# Patient Record
Sex: FEMALE | Race: BLACK OR AFRICAN AMERICAN | Employment: FULL TIME | URBAN - METROPOLITAN AREA
[De-identification: names, ages, dates, MRNs, and addresses within clinical notes are randomized per-mention and may not be internally consistent; named-entity substitution may affect disease eponyms.]

---

## 2018-05-18 ENCOUNTER — OFFICE VISIT (OUTPATIENT)
Dept: OBGYN CLINIC | Age: 28
End: 2018-05-18

## 2018-05-18 VITALS — HEART RATE: 94 BPM | WEIGHT: 117 LBS | DIASTOLIC BLOOD PRESSURE: 76 MMHG | SYSTOLIC BLOOD PRESSURE: 128 MMHG

## 2018-05-18 DIAGNOSIS — N91.2 AMENORRHEA: Primary | ICD-10-CM

## 2018-05-19 NOTE — PROGRESS NOTES
Subjective:      Patient presents for missed menses, pregnancy test positive. This is her first pregnancy and she has no complaints. Current Outpatient Prescriptions   Medication Sig Dispense Refill    prenatal vit,calc76/iron/folic (PNV 45-9 PO) Take  by mouth. No Known Allergies  History reviewed. No pertinent past medical history. History reviewed. No pertinent surgical history. History reviewed. No pertinent family history. Social History   Substance Use Topics    Smoking status: Not on file    Smokeless tobacco: Not on file    Alcohol use Not on file      Review of Systems    A comprehensive review of systems was negative except for that written in the HPI. Objective:     Visit Vitals    /76    Pulse 94    Wt 117 lb (53.1 kg)    LMP 02/14/2018 (Exact Date)     General appearance: alert, cooperative, no distress, appears stated age  Head: Normocephalic, without obvious abnormality, atraumatic  Lungs: normal respiratory effort  Abdomen: soft, non-tender. Bowel sounds normal. No masses,  no organomegaly  Pelvic: cervix normal in appearance, no CMT, no adnexal masses or tenderness  Neurologic: Grossly normal    Dating ultrasound done and confirms LMP dating. See report for details. Assessment/Plan:     Pregnancy confirmed. Follow up for prenatal intake visit. Patient is interested in [facility], scheduled given. Follow up 1-2 weeks. Plan of care discussed. Patient expressed understanding.

## 2018-05-29 ENCOUNTER — HOSPITAL ENCOUNTER (OUTPATIENT)
Dept: LAB | Age: 28
Discharge: HOME | End: 2018-05-29

## 2018-05-29 ENCOUNTER — ROUTINE PRENATAL (OUTPATIENT)
Dept: OBGYN CLINIC | Age: 28
End: 2018-05-29

## 2018-05-29 VITALS
DIASTOLIC BLOOD PRESSURE: 66 MMHG | BODY MASS INDEX: 23.16 KG/M2 | SYSTOLIC BLOOD PRESSURE: 115 MMHG | RESPIRATION RATE: 18 BRPM | OXYGEN SATURATION: 100 % | HEIGHT: 60 IN | WEIGHT: 118 LBS | HEART RATE: 88 BPM

## 2018-05-29 DIAGNOSIS — Z34.82 PRENATAL CARE, SUBSEQUENT PREGNANCY, SECOND TRIMESTER: Primary | ICD-10-CM

## 2018-05-29 DIAGNOSIS — Z3A.14 PREGNANCY WITH 14 COMPLETED WEEKS GESTATION: ICD-10-CM

## 2018-05-29 LAB
CHLAMYDIA, EXTERNAL: NORMAL
HBSAG, EXTERNAL: NORMAL
HIV, EXTERNAL: NORMAL
N. GONORRHEA, EXTERNAL: NORMAL
RPR, EXTERNAL: NORMAL
RUBELLA, EXTERNAL: NORMAL
TYPE, ABO & RH, EXTERNAL: NORMAL

## 2018-05-29 PROCEDURE — 99001 SPECIMEN HANDLING PT-LAB: CPT | Performed by: OBSTETRICS & GYNECOLOGY

## 2018-05-29 NOTE — PROGRESS NOTES
PRENATAL INTAKE SUMMARY    Ms. Maykel Paul presents today for her first prenatal visit. She is  at 14w6d. Working Estimated Date of Delivery:  18 by Patient's last menstrual period was 2018 (exact date). and confirmed with ultrasound. I have reviewed the patient's medical, obstetrical, social, and family histories, medications, and available lab results. SUBJECTIVE  She complains of frequent headaches, usually relieved by eating. OBJECTIVE  Initial Physical Exam (New OB)    GENERAL APPEARANCE: {appearance:155125::\"alert, well appearing\",\"in no apparent distress.    HEAD: normocephalic, atraumatic  MOUTH: mucous membranes moist, pharynx normal without lesions  THYROID: no thyromegaly or masses present  BREASTS: no masses noted, no significant tenderness, no palpable axillary nodes, no skin changes  LUNGS: clear to auscultation, no wheezes, rales or rhonchi, symmetric air entry  HEART: regular rate and rhythm, no murmurs  ABDOMEN: soft, nontender, nondistended, no abnormal masses, no epigastric pain, fundus soft, nontender 14 weeks size and FHT present  EXTREMITIES: no redness or tenderness in the calves or thighs, no edema  SKIN: normal coloration and turgor, no rashes  LYMPH NODES: no adenopathy palpable  NEUROLOGIC: alert, oriented, normal speech, no focal findings or movement disorder noted    PELVIC EXAM EXTERNAL GENITALIA: normal appearing vulva with no masses, tenderness or lesions  VAGINA: no abnormal discharge or lesions  CERVIX: no lesions or cervical motion tenderness  UTERUS: gravid  ADNEXA: no masses palpable and nontender  OB EXAM PELVIMETRY: appears adequate    ASSESSMENT  Normal pregnancy    PLAN  Prenatal care  Follow up for Centering group XI  See orders
None

## 2018-05-30 LAB
ABO GROUP BLD: NORMAL
BACTERIA UR CULT: NORMAL
BASOPHILS # BLD AUTO: 0 X10E3/UL (ref 0–0.2)
BASOPHILS NFR BLD AUTO: 0 %
BLD GP AB SCN SERPL QL: NEGATIVE
EOSINOPHIL # BLD AUTO: 0.1 X10E3/UL (ref 0–0.4)
EOSINOPHIL NFR BLD AUTO: 2 %
ERYTHROCYTE [DISTWIDTH] IN BLOOD BY AUTOMATED COUNT: 14.6 % (ref 12.3–15.4)
HBV SURFACE AG SERPL QL IA: NEGATIVE
HCT VFR BLD AUTO: 34.3 % (ref 34–46.6)
HGB A MFR BLD: 97.7 % (ref 96.4–98.8)
HGB A2 MFR BLD COLUMN CHROM: 2.3 % (ref 1.8–3.2)
HGB BLD-MCNC: 10.9 G/DL (ref 11.1–15.9)
HGB C MFR BLD: 0 %
HGB F MFR BLD: 0 % (ref 0–2)
HGB FRACT BLD-IMP: NORMAL
HGB OTHER MFR BLD HPLC: 0 %
HGB S BLD QL SOLY: NEGATIVE
HGB S MFR BLD: 0 %
HIV 1+2 AB+HIV1 P24 AG SERPL QL IA: NON REACTIVE
IMM GRANULOCYTES # BLD: 0 X10E3/UL (ref 0–0.1)
IMM GRANULOCYTES NFR BLD: 0 %
LYMPHOCYTES # BLD AUTO: 1.9 X10E3/UL (ref 0.7–3.1)
LYMPHOCYTES NFR BLD AUTO: 24 %
MCH RBC QN AUTO: 26.4 PG (ref 26.6–33)
MCHC RBC AUTO-ENTMCNC: 31.8 G/DL (ref 31.5–35.7)
MCV RBC AUTO: 83 FL (ref 79–97)
MONOCYTES # BLD AUTO: 0.4 X10E3/UL (ref 0.1–0.9)
MONOCYTES NFR BLD AUTO: 5 %
NEUTROPHILS # BLD AUTO: 5.6 X10E3/UL (ref 1.4–7)
NEUTROPHILS NFR BLD AUTO: 69 %
PLATELET # BLD AUTO: 267 X10E3/UL (ref 150–379)
RBC # BLD AUTO: 4.13 X10E6/UL (ref 3.77–5.28)
RH BLD: NEGATIVE
RPR SER QL: NON REACTIVE
RUBV IGG SERPL IA-ACNC: 2.86 INDEX
WBC # BLD AUTO: 8 X10E3/UL (ref 3.4–10.8)

## 2018-05-31 PROBLEM — O26.899 RH NEGATIVE STATE IN ANTEPARTUM PERIOD: Status: ACTIVE | Noted: 2018-05-31

## 2018-05-31 PROBLEM — Z67.91 RH NEGATIVE STATE IN ANTEPARTUM PERIOD: Status: ACTIVE | Noted: 2018-05-31

## 2018-06-01 LAB
C TRACH RRNA CVX QL NAA+PROBE: NEGATIVE
CYTOLOGIST CVX/VAG CYTO: NORMAL
CYTOLOGY CVX/VAG DOC THIN PREP: NORMAL
DX ICD CODE: NORMAL
LABCORP, 190119: NORMAL
Lab: NORMAL
N GONORRHOEA RRNA CVX QL NAA+PROBE: NEGATIVE
OTHER STN SPEC: NORMAL
PATH REPORT.FINAL DX SPEC: NORMAL
STAT OF ADQ CVX/VAG CYTO-IMP: NORMAL

## 2018-06-07 ENCOUNTER — ROUTINE PRENATAL (OUTPATIENT)
Dept: OBGYN CLINIC | Age: 28
End: 2018-06-07

## 2018-06-07 ENCOUNTER — HOSPITAL ENCOUNTER (OUTPATIENT)
Dept: LAB | Age: 28
Discharge: HOME | End: 2018-06-07

## 2018-06-07 VITALS
SYSTOLIC BLOOD PRESSURE: 122 MMHG | WEIGHT: 120.6 LBS | HEART RATE: 89 BPM | BODY MASS INDEX: 23.55 KG/M2 | DIASTOLIC BLOOD PRESSURE: 70 MMHG

## 2018-06-07 DIAGNOSIS — Z34.82 PRENATAL CARE, SUBSEQUENT PREGNANCY, SECOND TRIMESTER: Primary | ICD-10-CM

## 2018-06-07 DIAGNOSIS — Z3A.16 16 WEEKS GESTATION OF PREGNANCY: ICD-10-CM

## 2018-06-07 PROCEDURE — 99001 SPECIMEN HANDLING PT-LAB: CPT | Performed by: OBSTETRICS & GYNECOLOGY

## 2018-06-07 NOTE — PROGRESS NOTES
Patient here for Centering group, no unusual complaints. Quad screen drawn, morphology scan scheduled. Follow up 4 weeks. Plan of care discussed. Patient expressed understanding.

## 2018-06-12 LAB
2ND TRIMESTER 4 SCREEN SERPL-IMP: NORMAL
2ND TRIMESTER 4 SCREEN SERPL-IMP: NORMAL
AFP ADJ MOM SERPL: 0.92
AFP SERPL-MCNC: 38.7 NG/ML
AGE AT DELIVERY: 26.2 YR
COMMENTS, 018014: NORMAL
FET TS 18 RISK FROM MAT AGE: NORMAL
FET TS 21 RISK FROM MAT AGE: 971
GA METHOD: NORMAL
GA: 16 WEEKS
HCG ADJ MOM SERPL: 0.77
HCG SERPL-ACNC: NORMAL MIU/ML
IDDM PATIENT QL: NO
INHIBIN A ADJ MOM SERPL: 0.63
INHIBIN A SERPL-MCNC: 123.73 PG/ML
MULTIPLE PREGNANCY: NO
NEURAL TUBE DEFECT RISK FETUS: NORMAL %
RESULTS, 017389: NORMAL
TS 18 RISK FETUS: NORMAL
TS 21 RISK FETUS: NORMAL
U ESTRIOL ADJ MOM SERPL: 1.05
U ESTRIOL SERPL-MCNC: 0.87 NG/ML

## 2018-07-03 ENCOUNTER — CLINICAL SUPPORT (OUTPATIENT)
Dept: OBGYN CLINIC | Age: 28
End: 2018-07-03

## 2018-07-03 DIAGNOSIS — Z3A.16 16 WEEKS GESTATION OF PREGNANCY: ICD-10-CM

## 2018-07-03 DIAGNOSIS — Z34.82 PRENATAL CARE, SUBSEQUENT PREGNANCY, SECOND TRIMESTER: ICD-10-CM

## 2018-07-05 ENCOUNTER — ROUTINE PRENATAL (OUTPATIENT)
Dept: OBGYN CLINIC | Age: 28
End: 2018-07-05

## 2018-07-05 VITALS
HEART RATE: 89 BPM | BODY MASS INDEX: 25.39 KG/M2 | SYSTOLIC BLOOD PRESSURE: 135 MMHG | DIASTOLIC BLOOD PRESSURE: 76 MMHG | WEIGHT: 130 LBS

## 2018-07-05 DIAGNOSIS — Z3A.20 20 WEEKS GESTATION OF PREGNANCY: ICD-10-CM

## 2018-07-05 DIAGNOSIS — Z34.82 PRENATAL CARE, SUBSEQUENT PREGNANCY, SECOND TRIMESTER: Primary | ICD-10-CM

## 2018-07-05 NOTE — PROGRESS NOTES
Patient here for Centering group, no unusual complaints. Morphology scan reviewed and no abnormalities noted. Follow up 4 weeks for Centering. Plan of care discussed. Patient expressed understanding.

## 2018-08-02 ENCOUNTER — ROUTINE PRENATAL (OUTPATIENT)
Dept: OBGYN CLINIC | Age: 28
End: 2018-08-02

## 2018-08-02 VITALS
HEIGHT: 60 IN | SYSTOLIC BLOOD PRESSURE: 125 MMHG | WEIGHT: 137 LBS | BODY MASS INDEX: 26.9 KG/M2 | RESPIRATION RATE: 18 BRPM | HEART RATE: 88 BPM | DIASTOLIC BLOOD PRESSURE: 79 MMHG

## 2018-08-02 DIAGNOSIS — Z3A.24 24 WEEKS GESTATION OF PREGNANCY: ICD-10-CM

## 2018-08-02 DIAGNOSIS — Z34.82 PRENATAL CARE, SUBSEQUENT PREGNANCY, SECOND TRIMESTER: Primary | ICD-10-CM

## 2018-08-04 NOTE — PROGRESS NOTES
Patient here for Centering group, no unusual complaints. Follow up for Centering in 4 weeks. Plan of care discussed. Patient expressed understanding.

## 2018-08-30 ENCOUNTER — ROUTINE PRENATAL (OUTPATIENT)
Dept: OBGYN CLINIC | Age: 28
End: 2018-08-30

## 2018-08-30 LAB — HEPATITIS C AB,   EXT: NORMAL

## 2018-09-10 ENCOUNTER — HOSPITAL ENCOUNTER (OUTPATIENT)
Dept: ULTRASOUND IMAGING | Age: 28
Discharge: HOME | End: 2018-09-10
Attending: OBSTETRICS & GYNECOLOGY
Payer: MEDICAID

## 2018-09-10 DIAGNOSIS — Z3A.28 28 WEEKS GESTATION OF PREGNANCY: ICD-10-CM

## 2018-09-10 DIAGNOSIS — O26.893 OTHER SPECIFIED PREGNANCY RELATED CONDITIONS, THIRD TRIMESTER: ICD-10-CM

## 2018-09-10 DIAGNOSIS — R10.11 RIGHT UPPER QUADRANT PAIN: ICD-10-CM

## 2018-09-10 PROCEDURE — 76705 ECHO EXAM OF ABDOMEN: CPT

## 2018-10-24 LAB — GRBS, EXTERNAL: NORMAL

## 2018-11-20 ENCOUNTER — ANESTHESIA EVENT (OUTPATIENT)
Dept: LABOR AND DELIVERY | Age: 28
DRG: 560 | End: 2018-11-20
Payer: COMMERCIAL

## 2018-11-20 ENCOUNTER — HOSPITAL ENCOUNTER (OUTPATIENT)
Age: 28
Setting detail: OBSERVATION
Discharge: HOME | DRG: 560 | End: 2018-11-20
Attending: OBSTETRICS & GYNECOLOGY | Admitting: OBSTETRICS & GYNECOLOGY
Payer: COMMERCIAL

## 2018-11-20 ENCOUNTER — HOSPITAL ENCOUNTER (INPATIENT)
Age: 28
LOS: 2 days | Discharge: HOME | DRG: 560 | End: 2018-11-22
Attending: OBSTETRICS & GYNECOLOGY | Admitting: OBSTETRICS & GYNECOLOGY
Payer: COMMERCIAL

## 2018-11-20 ENCOUNTER — ANESTHESIA (OUTPATIENT)
Dept: LABOR AND DELIVERY | Age: 28
DRG: 560 | End: 2018-11-20
Payer: COMMERCIAL

## 2018-11-20 VITALS
RESPIRATION RATE: 16 BRPM | HEIGHT: 60 IN | OXYGEN SATURATION: 100 % | WEIGHT: 165 LBS | HEART RATE: 79 BPM | DIASTOLIC BLOOD PRESSURE: 57 MMHG | BODY MASS INDEX: 32.39 KG/M2 | TEMPERATURE: 98.1 F | SYSTOLIC BLOOD PRESSURE: 120 MMHG

## 2018-11-20 PROBLEM — O47.9 THREATENED LABOR AT TERM: Status: ACTIVE | Noted: 2018-11-20

## 2018-11-20 LAB
ABO + RH BLD: NORMAL
BASOPHILS # BLD: 0 K/UL (ref 0–0.1)
BASOPHILS NFR BLD: 0 % (ref 0–2)
BLOOD GROUP ANTIBODIES SERPL: NORMAL
DIFFERENTIAL METHOD BLD: ABNORMAL
EOSINOPHIL # BLD: 0 K/UL (ref 0–0.4)
EOSINOPHIL NFR BLD: 0 % (ref 0–5)
ERYTHROCYTE [DISTWIDTH] IN BLOOD BY AUTOMATED COUNT: 14.1 % (ref 11.6–14.5)
HCT VFR BLD AUTO: 29.9 % (ref 35–45)
HGB BLD-MCNC: 9.8 G/DL (ref 12–16)
LYMPHOCYTES # BLD: 1.5 K/UL (ref 0.9–3.6)
LYMPHOCYTES NFR BLD: 14 % (ref 21–52)
MCH RBC QN AUTO: 27 PG (ref 24–34)
MCHC RBC AUTO-ENTMCNC: 32.8 G/DL (ref 31–37)
MCV RBC AUTO: 82.4 FL (ref 74–97)
MONOCYTES # BLD: 0.9 K/UL (ref 0.05–1.2)
MONOCYTES NFR BLD: 8 % (ref 3–10)
NEUTS SEG # BLD: 8.4 K/UL (ref 1.8–8)
NEUTS SEG NFR BLD: 78 % (ref 40–73)
PLATELET # BLD AUTO: 212 K/UL (ref 135–420)
PMV BLD AUTO: 9.6 FL (ref 9.2–11.8)
RBC # BLD AUTO: 3.63 M/UL (ref 4.2–5.3)
SPECIMEN EXP DATE BLD: NORMAL
WBC # BLD AUTO: 10.8 K/UL (ref 4.6–13.2)

## 2018-11-20 PROCEDURE — 86901 BLOOD TYPING SEROLOGIC RH(D): CPT

## 2018-11-20 PROCEDURE — 74011250636 HC RX REV CODE- 250/636: Performed by: ADVANCED PRACTICE MIDWIFE

## 2018-11-20 PROCEDURE — 74011250636 HC RX REV CODE- 250/636: Performed by: ANESTHESIOLOGY

## 2018-11-20 PROCEDURE — 77030034849

## 2018-11-20 PROCEDURE — 59025 FETAL NON-STRESS TEST: CPT

## 2018-11-20 PROCEDURE — 74011250636 HC RX REV CODE- 250/636

## 2018-11-20 PROCEDURE — 85025 COMPLETE CBC W/AUTO DIFF WBC: CPT

## 2018-11-20 PROCEDURE — 65270000029 HC RM PRIVATE

## 2018-11-20 PROCEDURE — 74011000250 HC RX REV CODE- 250: Performed by: ANESTHESIOLOGY

## 2018-11-20 PROCEDURE — 99284 EMERGENCY DEPT VISIT MOD MDM: CPT

## 2018-11-20 PROCEDURE — 74011000250 HC RX REV CODE- 250

## 2018-11-20 PROCEDURE — 74011250637 HC RX REV CODE- 250/637: Performed by: ADVANCED PRACTICE MIDWIFE

## 2018-11-20 PROCEDURE — 3E0R3BZ INTRODUCTION OF ANESTHETIC AGENT INTO SPINAL CANAL, PERCUTANEOUS APPROACH: ICD-10-PCS | Performed by: ANESTHESIOLOGY

## 2018-11-20 PROCEDURE — 99218 HC RM OBSERVATION: CPT

## 2018-11-20 PROCEDURE — 00HU33Z INSERTION OF INFUSION DEVICE INTO SPINAL CANAL, PERCUTANEOUS APPROACH: ICD-10-PCS | Performed by: ANESTHESIOLOGY

## 2018-11-20 PROCEDURE — 4A0HXCZ MEASUREMENT OF PRODUCTS OF CONCEPTION, CARDIAC RATE, EXTERNAL APPROACH: ICD-10-PCS | Performed by: OBSTETRICS & GYNECOLOGY

## 2018-11-20 RX ORDER — LIDOCAINE HYDROCHLORIDE AND EPINEPHRINE 15; 5 MG/ML; UG/ML
INJECTION, SOLUTION EPIDURAL
Status: COMPLETED | OUTPATIENT
Start: 2018-11-20 | End: 2018-11-20

## 2018-11-20 RX ORDER — MISOPROSTOL 200 UG/1
800 TABLET ORAL
Status: COMPLETED | OUTPATIENT
Start: 2018-11-20 | End: 2018-11-21

## 2018-11-20 RX ORDER — OXYTOCIN/RINGER'S LACTATE 20/1000 ML
999 PLASTIC BAG, INJECTION (ML) INTRAVENOUS ONCE
Status: COMPLETED | OUTPATIENT
Start: 2018-11-20 | End: 2018-11-21

## 2018-11-20 RX ORDER — MAG HYDROX/ALUMINUM HYD/SIMETH 200-200-20
15 SUSPENSION, ORAL (FINAL DOSE FORM) ORAL
Status: DISCONTINUED | OUTPATIENT
Start: 2018-11-20 | End: 2018-11-21 | Stop reason: HOSPADM

## 2018-11-20 RX ORDER — BUPIVACAINE HYDROCHLORIDE 2.5 MG/ML
INJECTION, SOLUTION EPIDURAL; INFILTRATION; INTRACAUDAL AS NEEDED
Status: DISCONTINUED | OUTPATIENT
Start: 2018-11-20 | End: 2018-11-21 | Stop reason: HOSPADM

## 2018-11-20 RX ORDER — FENTANYL CITRATE 50 UG/ML
INJECTION, SOLUTION INTRAMUSCULAR; INTRAVENOUS AS NEEDED
Status: DISCONTINUED | OUTPATIENT
Start: 2018-11-20 | End: 2018-11-21 | Stop reason: HOSPADM

## 2018-11-20 RX ORDER — TERBUTALINE SULFATE 1 MG/ML
0.25 INJECTION SUBCUTANEOUS
Status: DISCONTINUED | OUTPATIENT
Start: 2018-11-20 | End: 2018-11-21 | Stop reason: HOSPADM

## 2018-11-20 RX ORDER — SODIUM CHLORIDE, SODIUM LACTATE, POTASSIUM CHLORIDE, CALCIUM CHLORIDE 600; 310; 30; 20 MG/100ML; MG/100ML; MG/100ML; MG/100ML
125 INJECTION, SOLUTION INTRAVENOUS CONTINUOUS
Status: DISCONTINUED | OUTPATIENT
Start: 2018-11-20 | End: 2018-11-21 | Stop reason: HOSPADM

## 2018-11-20 RX ORDER — CARBOPROST TROMETHAMINE 250 UG/ML
250 INJECTION, SOLUTION INTRAMUSCULAR
Status: DISCONTINUED | OUTPATIENT
Start: 2018-11-20 | End: 2018-11-21 | Stop reason: HOSPADM

## 2018-11-20 RX ORDER — NALBUPHINE HYDROCHLORIDE 10 MG/ML
INJECTION, SOLUTION INTRAMUSCULAR; INTRAVENOUS; SUBCUTANEOUS
Status: COMPLETED
Start: 2018-11-20 | End: 2018-11-20

## 2018-11-20 RX ORDER — LIDOCAINE HYDROCHLORIDE AND EPINEPHRINE 15; 5 MG/ML; UG/ML
INJECTION, SOLUTION EPIDURAL AS NEEDED
Status: DISCONTINUED | OUTPATIENT
Start: 2018-11-20 | End: 2018-11-21 | Stop reason: HOSPADM

## 2018-11-20 RX ORDER — METHYLERGONOVINE MALEATE 0.2 MG/ML
0.2 INJECTION INTRAVENOUS AS NEEDED
Status: DISCONTINUED | OUTPATIENT
Start: 2018-11-20 | End: 2018-11-21 | Stop reason: HOSPADM

## 2018-11-20 RX ORDER — NALOXONE HYDROCHLORIDE 0.4 MG/ML
0.2 INJECTION, SOLUTION INTRAMUSCULAR; INTRAVENOUS; SUBCUTANEOUS AS NEEDED
Status: DISCONTINUED | OUTPATIENT
Start: 2018-11-20 | End: 2018-11-22 | Stop reason: HOSPADM

## 2018-11-20 RX ORDER — DIPHENHYDRAMINE HYDROCHLORIDE 50 MG/ML
25 INJECTION, SOLUTION INTRAMUSCULAR; INTRAVENOUS
Status: DISCONTINUED | OUTPATIENT
Start: 2018-11-20 | End: 2018-11-22 | Stop reason: HOSPADM

## 2018-11-20 RX ORDER — FENTANYL CITRATE 50 UG/ML
100 INJECTION, SOLUTION INTRAMUSCULAR; INTRAVENOUS ONCE
Status: COMPLETED | OUTPATIENT
Start: 2018-11-20 | End: 2018-11-20

## 2018-11-20 RX ORDER — EPHEDRINE SULFATE 50 MG/ML
10 INJECTION, SOLUTION INTRAVENOUS AS NEEDED
Status: DISCONTINUED | OUTPATIENT
Start: 2018-11-20 | End: 2018-11-22 | Stop reason: HOSPADM

## 2018-11-20 RX ORDER — ACETAMINOPHEN 650 MG/1
650 SUPPOSITORY RECTAL
Status: DISCONTINUED | OUTPATIENT
Start: 2018-11-20 | End: 2018-11-21 | Stop reason: HOSPADM

## 2018-11-20 RX ORDER — LIDOCAINE HYDROCHLORIDE 10 MG/ML
30 INJECTION, SOLUTION EPIDURAL; INFILTRATION; INTRACAUDAL; PERINEURAL AS NEEDED
Status: DISCONTINUED | OUTPATIENT
Start: 2018-11-20 | End: 2018-11-21 | Stop reason: HOSPADM

## 2018-11-20 RX ORDER — OXYTOCIN/0.9 % SODIUM CHLORIDE 30/500 ML
0-25 PLASTIC BAG, INJECTION (ML) INTRAVENOUS
Status: DISCONTINUED | OUTPATIENT
Start: 2018-11-20 | End: 2018-11-22 | Stop reason: HOSPADM

## 2018-11-20 RX ORDER — NALBUPHINE HYDROCHLORIDE 10 MG/ML
10 INJECTION, SOLUTION INTRAMUSCULAR; INTRAVENOUS; SUBCUTANEOUS
Status: DISCONTINUED | OUTPATIENT
Start: 2018-11-20 | End: 2018-11-21 | Stop reason: HOSPADM

## 2018-11-20 RX ORDER — ONDANSETRON 2 MG/ML
4 INJECTION INTRAMUSCULAR; INTRAVENOUS
Status: DISCONTINUED | OUTPATIENT
Start: 2018-11-20 | End: 2018-11-21 | Stop reason: HOSPADM

## 2018-11-20 RX ORDER — OXYTOCIN/RINGER'S LACTATE 20/1000 ML
125 PLASTIC BAG, INJECTION (ML) INTRAVENOUS CONTINUOUS
Status: DISCONTINUED | OUTPATIENT
Start: 2018-11-20 | End: 2018-11-21 | Stop reason: HOSPADM

## 2018-11-20 RX ORDER — SALICYLIC ACID
POWDER (GRAM) MISCELLANEOUS
Status: DISCONTINUED
Start: 2018-11-20 | End: 2018-11-21

## 2018-11-20 RX ORDER — TRISODIUM CITRATE DIHYDRATE AND CITRIC ACID MONOHYDRATE 500; 334 MG/5ML; MG/5ML
30 SOLUTION ORAL AS NEEDED
Status: DISCONTINUED | OUTPATIENT
Start: 2018-11-20 | End: 2018-11-21 | Stop reason: HOSPADM

## 2018-11-20 RX ADMIN — LIDOCAINE HYDROCHLORIDE AND EPINEPHRINE 3 ML: 15; 5 INJECTION, SOLUTION EPIDURAL at 14:57

## 2018-11-20 RX ADMIN — FENTANYL CITRATE 100 MCG: 50 INJECTION, SOLUTION INTRAMUSCULAR; INTRAVENOUS at 15:01

## 2018-11-20 RX ADMIN — Medication 9 ML/HR: at 15:15

## 2018-11-20 RX ADMIN — OXYTOCIN-SODIUM CHLORIDE 0.9% IV SOLN 30 UNIT/500ML 2 MILLI-UNITS/MIN: 30-0.9/5 SOLUTION at 17:47

## 2018-11-20 RX ADMIN — NALBUPHINE HYDROCHLORIDE 10 MG: 10 INJECTION, SOLUTION INTRAMUSCULAR; INTRAVENOUS; SUBCUTANEOUS at 13:37

## 2018-11-20 RX ADMIN — Medication 9 ML/HR: at 23:06

## 2018-11-20 RX ADMIN — ALUMINUM HYDROXIDE, MAGNESIUM HYDROXIDE, AND SIMETHICONE 15 ML: 200; 200; 20 SUSPENSION ORAL at 16:48

## 2018-11-20 RX ADMIN — SODIUM CHLORIDE, SODIUM LACTATE, POTASSIUM CHLORIDE, AND CALCIUM CHLORIDE 125 ML/HR: 600; 310; 30; 20 INJECTION, SOLUTION INTRAVENOUS at 14:35

## 2018-11-20 RX ADMIN — BUPIVACAINE HYDROCHLORIDE 3 ML: 2.5 INJECTION, SOLUTION EPIDURAL; INFILTRATION; INTRACAUDAL at 15:01

## 2018-11-20 RX ADMIN — SODIUM CHLORIDE, SODIUM LACTATE, POTASSIUM CHLORIDE, AND CALCIUM CHLORIDE 125 ML/HR: 600; 310; 30; 20 INJECTION, SOLUTION INTRAVENOUS at 16:08

## 2018-11-20 RX ADMIN — SODIUM CHLORIDE, SODIUM LACTATE, POTASSIUM CHLORIDE, AND CALCIUM CHLORIDE 125 ML/HR: 600; 310; 30; 20 INJECTION, SOLUTION INTRAVENOUS at 13:28

## 2018-11-20 RX ADMIN — FENTANYL CITRATE 100 MCG: 50 INJECTION, SOLUTION INTRAMUSCULAR; INTRAVENOUS at 14:57

## 2018-11-20 RX ADMIN — BUPIVACAINE HYDROCHLORIDE 3 ML: 2.5 INJECTION, SOLUTION EPIDURAL; INFILTRATION; INTRACAUDAL at 15:03

## 2018-11-20 NOTE — PROGRESS NOTES
Labor Progress Note Nereyda Smalls would like an exam.  Little change in dilation now that the baby has settled back into the pelvis with her empty bladder. Discuss pitocin and she is initially wary as she has heard that it makes the contractions stronger. Discussed pitocin for use in labor and post delivery. She and her sister listen and ask questions and agree to augmentation with pitocin. Also answer questions about meconium, fetal response, peds, delayed cord clamping vs need to milk the cord and get baby to peds. She understands and we will know more with the birth of her baby. Pelvic exam:     
 Cervical Exam 
Dilation (cm): 7 Eff: 100 % Station: -1 Position: Anterior Cervical Consistency: Soft Vaginal exam done by? : cash hoover Baby Position: Vertex Membrane Status: SROM 
FHTs Reactive from 130 UCs 3-6 minutes. Visit Vitals /71 Pulse (!) 103 Temp 97.8 °F (36.6 °C) Resp 18 SpO2 100% Breastfeeding? No  
 
 
Temp (24hrs), Av.1 °F (36.7 °C), Min:97.8 °F (36.6 °C), Max:98.8 °F (37.1 °C) Recent Labs  
  18 
1315 WBC 10.8 HGB 9.8* Assessment: Irregular contractions. Maternal and fetal wellbeing. Good pain management. Plan: Pitocin augmentation planned. Anticipate . John Perales CNM 
2018 
5:24 PM

## 2018-11-20 NOTE — H&P
HPI: Jammie was having contractions at home q 5-6 minutes and came in for labor assessment Subjective:  
 
JUAN J.DTAMARA Mukherjee, 34 y.o.   at 39w6d presents to L&D with c/o Uterine contractions: Date/time of onset: 0130 and Frequency: Every 5-6 minutes. Scant vb. Denies LOF. +FM Assessment: 
No past medical history on file. Past Surgical History:  
Procedure Laterality Date  HX APPENDECTOMY  2015  HX DILATION AND CURETTAGE    HX DILATION AND CURETTAGE   No Known Allergies Prior to Admission medications Medication Sig Start Date End Date Taking? Authorizing Provider  
prenatal vit,calc76/iron/folic (PNV 19-4 PO) Take  by mouth. Yes Provider, Historical  
  
 
Objective:  
 
Vitals: 
Vitals:  
 18 0340 18 0355 18 0358 18 8824 BP: 132/73   123/60 Pulse: 87   75 Resp: 20 Temp: 98.8 °F (37.1 °C) SpO2: 100% 100% Weight:   74.8 kg (165 lb) Height:   5' (1.524 m) Physical Exam: 
Patient with mild distress during ctxs, but able to sleep well between. Membranes:  Intact Fetal Heart Rate: Baseline: 140 per minute Variability: moderate Accelerations: yes after PO hydration Decelerations: none Cervical exam: Cervical Exam 
Dilation (cm): 2 Eff: 90 % Station: -2 Membrane Status: Intact Assessment/Plan:  
 
Assessment:  
Patient Active Problem List  
Diagnosis Code  Rh negative state in antepartum period O09.899, Z67.91  
 Threatened labor at term O50.10 Plan: No marked cervical change after 3.5 hours of observation. Contractions have gotten a little further apart and pt able to rest between. She agrees with plan to DC home. Standard & ER labor precautions. Call office prn or follow-up in office for routine visit. Signed By:  Lacey Tai CNM 2018

## 2018-11-20 NOTE — H&P
History & Physical 
 
Name: Valentina Diaz MRN: [id number]  SSN: [id number] YOB: 1990  Age: 34 y.o. Sex: female Subjective:  
 
Estimated Date of Delivery: 18 OB History  Para Term  AB Living 3 0 0   2 0 SAB TAB Ectopic Molar Multiple Live Births 2 Ms. Ester Islas is admitted with pregnancy at 39w6d for active labor. Jammie was here in early labor early this morning. Has returned in active labor with good progress. Wanted IV meds on arrival and then ready for epidural.  Some blood pressure dips with epidural which resolved easily. She is currently stable and comfortable. Prenatal care has been followed by [facility] from her 27 wk transfer for midwifery care. Please see prenatal records for details. Prenatal course was complicated by late entry to prenatal care, anemia and Rh negative with Rhogam administration. Harvinder Middleton Past Medical History:  
Diagnosis Date  Anemia Past Surgical History:  
Procedure Laterality Date  HX APPENDECTOMY  2015  HX DILATION AND CURETTAGE    HX DILATION AND CURETTAGE   Social History Occupational History  Not on file Tobacco Use  Smoking status: Former Smoker Last attempt to quit: 2018 Years since quittin.8  Smokeless tobacco: Never Used Substance and Sexual Activity  Alcohol use: No  
 Drug use: No  
 Sexual activity: Yes  
  Partners: Male Birth control/protection: None No family history on file. No Known Allergies Prior to Admission medications Medication Sig Start Date End Date Taking? Authorizing Provider  
prenatal vit,calc76/iron/folic (PNV 60-3 PO) Take  by mouth. Yes Provider, Historical  
  
 
Review of Systems: Pertinent items are noted in HPI. Objective:  
 
Vitals: 
Vitals:  
 18 1557 18 1601 18 1602 18 1607 BP:  104/46 Pulse:  92 Resp:      
Temp:      
 SpO2: 99%  98% 100% Physical Exam: 
Patient with mild distress. Abdomen: soft, nontender Fundus: soft and non tender Perineum: blood absent, amniotic fluid present Cervical Exam: 6 cm dilated 90% effaced Station: floating but has has liter and capps pending. Was lower for admitting exam by RN   
Presenting Part: cephalic Cervical Position: mid position Lower Extremities:  - Edema No 
Membranes:  arrived intact and ruptured on exam for meconium fluid. Fetal Heart Rate: Reactive Baseline: 130 per minute Some decelerations after epidural that have resolved with fluids and positioning. Variability: moderate Accelerations: yes Decelerations: none Uterine contractions: regular, every 3-6 minutes Prenatal Labs:  
Lab Results Component Value Date/Time  
 Rubella, External immune 2018 GrBStrep, External nrg 10/24/2018 HBsAg, External neg 2018 HIV, External neg 2018 RPR, External neg 2018 Gonorrhea, External neg 2018 Chlamydia, External neg 2018 Group B Strep was negative. Assessment/Plan:  
Assessment: IUP at term in active labor. Reassuring FHTs and maternal wellbeing. GBS neg. Plan: Admit for labor and birth. Dr Carol Chapman notified of the admission. Anticipate . .   
 
Signed By:  Goran Whitten CNM 2018

## 2018-11-20 NOTE — ANESTHESIA PREPROCEDURE EVALUATION
Anesthetic History No history of anesthetic complications Review of Systems / Medical History Patient summary reviewed and pertinent labs reviewed Pulmonary Within defined limits Neuro/Psych Within defined limits Cardiovascular Within defined limits Exercise tolerance: >4 METS 
  
GI/Hepatic/Renal 
Within defined limits Endo/Other Within defined limits Other Findings Physical Exam 
 
Airway Mallampati: II 
TM Distance: 4 - 6 cm Neck ROM: normal range of motion Cardiovascular Rhythm: regular Rate: normal 
 
 
 
 Dental 
No notable dental hx Pulmonary Abdominal 
Abdominal exam normal 
 
 
 Other Findings Anesthetic Plan ASA: 2 Anesthesia type: epidural 
 
 
 
 
 
Anesthetic plan and risks discussed with: Patient

## 2018-11-20 NOTE — PROGRESS NOTES
6742- Patient 39.6weeks  arrives to labor and delivery with complaints of contractions rating pain 7/10 and vaginal bleeding that began at 0130.  
0348- GARRY Galicia at bedside, SVE /-2. Orders received for PO hydration and continue to monitor can do intermittent monitoring when baby becomes reactive. Will recheck in a few hours. 0453-Patient fetal strip reactive. [facility]nd [facility]. Patient taken off monitor. 1606- GARRY Galicia at bedside SVE /-2. Patient to be placed on monitor for NST and discharged to home. 9639- Verbal shift change report given to OSITO Nur RN (oncoming nurse) by FREEDOM Hurley RN (offgoing nurse). Report included the following information SBAR and Kardex.

## 2018-11-20 NOTE — PROGRESS NOTES
wnbf to l&d for c/o labor- pt up to change clothes- efm and toco applied- oriented to room etc- supportive  present- pt coping well- cash cnm aware of pt 
 
1400- anesth aware of pt request for epidl 1510- fhr with prolonged decels and lates following epid- HOB down- IV bolus continues- pt turned lt and rt- pt asymptomatic- cash cnm in sve- leaking dark brown fluid 1600- no decels noted after 1800cc LR infused- pt remains on lt   
 
1747- pitocin started for augment of labor- infusing via pump- verified with kandy dove 
 
1911-Bedside and Verbal shift change report given to ly rn (oncoming nurse) by laurent rnc (offgoing nurse). Report included the following information SBAR, Kardex, Procedure Summary, Intake/Output, MAR and Recent Results.

## 2018-11-20 NOTE — ANESTHESIA PROCEDURE NOTES
Epidural Block Start time: 11/20/2018 2:32 PM 
End time: 11/20/2018 3:05 PM 
Performed by: Ana Catalan CRNA Authorized by: Michelle Evans MD  
 
Pre-Procedure Indication: labor epidural   
Preanesthetic Checklist: patient identified, risks and benefits discussed, anesthesia consent, site marked, patient being monitored, timeout performed and anesthesia consent Timeout Time: 14:33 Epidural:  
Patient position:  Seated Prep: Betadine Location:  L3-4 Needle and Epidural Catheter:  
Needle Type:  Tuohy Needle Gauge:  18 G Injection Technique:  Loss of resistance using air Attempts:  2 Catheter Size:  19 G Catheter at Skin Depth (cm):  12 Depth in Epidural Space (cm):  6 Events: no blood with aspiration, no cerebrospinal fluid with aspiration, no paresthesia and negative aspiration test   
Test Dose:  Negative Assessment:  
Catheter Secured:  Tegaderm and tape Insertion:  Uncomplicated Patient tolerance:  Patient tolerated the procedure well with no immediate complications

## 2018-11-20 NOTE — PROGRESS NOTES
Assumed care of pt- po fluids given - pt snuggling in bed with so-no c/o 
 
812- monitor off- offered to continue observation- pt opts to go home- discharge instructions given and ok with pt- encouraged- rest, shower for pain relief and light food 830- discharge home - return for increased labor or scheduled appointment

## 2018-11-21 PROCEDURE — 85461 HEMOGLOBIN FETAL: CPT

## 2018-11-21 PROCEDURE — 86901 BLOOD TYPING SEROLOGIC RH(D): CPT

## 2018-11-21 PROCEDURE — 75410000003 HC RECOV DEL/VAG/CSECN EA 0.5 HR

## 2018-11-21 PROCEDURE — 74011250637 HC RX REV CODE- 250/637: Performed by: ADVANCED PRACTICE MIDWIFE

## 2018-11-21 PROCEDURE — 77010026065 HC OXYGEN MINIMUM MEDICAL AIR

## 2018-11-21 PROCEDURE — 3E0334Z INTRODUCTION OF SERUM, TOXOID AND VACCINE INTO PERIPHERAL VEIN, PERCUTANEOUS APPROACH: ICD-10-PCS | Performed by: OBSTETRICS & GYNECOLOGY

## 2018-11-21 PROCEDURE — 74011250636 HC RX REV CODE- 250/636: Performed by: ADVANCED PRACTICE MIDWIFE

## 2018-11-21 PROCEDURE — 76060000078 HC EPIDURAL ANESTHESIA

## 2018-11-21 PROCEDURE — 75410000002 HC LABOR FEE PER 1 HR

## 2018-11-21 PROCEDURE — 65270000029 HC RM PRIVATE

## 2018-11-21 PROCEDURE — 75410000000 HC DELIVERY VAGINAL/SINGLE

## 2018-11-21 RX ORDER — ZOLPIDEM TARTRATE 5 MG/1
5 TABLET ORAL
Status: DISCONTINUED | OUTPATIENT
Start: 2018-11-21 | End: 2018-11-22 | Stop reason: HOSPADM

## 2018-11-21 RX ORDER — AMOXICILLIN 250 MG
1 CAPSULE ORAL
Status: DISCONTINUED | OUTPATIENT
Start: 2018-11-21 | End: 2018-11-22 | Stop reason: HOSPADM

## 2018-11-21 RX ORDER — IBUPROFEN 400 MG/1
800 TABLET ORAL
Status: DISCONTINUED | OUTPATIENT
Start: 2018-11-21 | End: 2018-11-22 | Stop reason: HOSPADM

## 2018-11-21 RX ORDER — OXYCODONE AND ACETAMINOPHEN 5; 325 MG/1; MG/1
1-2 TABLET ORAL
Status: DISCONTINUED | OUTPATIENT
Start: 2018-11-21 | End: 2018-11-22 | Stop reason: HOSPADM

## 2018-11-21 RX ORDER — PROMETHAZINE HYDROCHLORIDE 25 MG/ML
25 INJECTION, SOLUTION INTRAMUSCULAR; INTRAVENOUS
Status: DISCONTINUED | OUTPATIENT
Start: 2018-11-21 | End: 2018-11-22 | Stop reason: HOSPADM

## 2018-11-21 RX ORDER — ACETAMINOPHEN 325 MG/1
650 TABLET ORAL
Status: DISCONTINUED | OUTPATIENT
Start: 2018-11-21 | End: 2018-11-22 | Stop reason: HOSPADM

## 2018-11-21 RX ORDER — OXYCODONE AND ACETAMINOPHEN 5; 325 MG/1; MG/1
2 TABLET ORAL
Status: DISCONTINUED | OUTPATIENT
Start: 2018-11-21 | End: 2018-11-21

## 2018-11-21 RX ORDER — IBUPROFEN 400 MG/1
TABLET ORAL
Status: DISPENSED
Start: 2018-11-21 | End: 2018-11-21

## 2018-11-21 RX ADMIN — OXYCODONE HYDROCHLORIDE AND ACETAMINOPHEN 2 TABLET: 5; 325 TABLET ORAL at 21:33

## 2018-11-21 RX ADMIN — HUMAN RHO(D) IMMUNE GLOBULIN 0.3 MG: 300 INJECTION, SOLUTION INTRAMUSCULAR at 11:01

## 2018-11-21 RX ADMIN — ACETAMINOPHEN 650 MG: 325 TABLET, FILM COATED ORAL at 06:48

## 2018-11-21 RX ADMIN — ACETAMINOPHEN 650 MG: 325 TABLET, FILM COATED ORAL at 17:28

## 2018-11-21 RX ADMIN — IBUPROFEN 800 MG: 400 TABLET ORAL at 18:17

## 2018-11-21 RX ADMIN — ACETAMINOPHEN 650 MG: 325 TABLET, FILM COATED ORAL at 12:53

## 2018-11-21 RX ADMIN — IBUPROFEN 800 MG: 400 TABLET ORAL at 09:55

## 2018-11-21 RX ADMIN — MISOPROSTOL 800 MCG: 200 TABLET ORAL at 00:51

## 2018-11-21 RX ADMIN — SODIUM CHLORIDE, SODIUM LACTATE, POTASSIUM CHLORIDE, AND CALCIUM CHLORIDE 125 ML/HR: 600; 310; 30; 20 INJECTION, SOLUTION INTRAVENOUS at 00:05

## 2018-11-21 RX ADMIN — IBUPROFEN 800 MG: 400 TABLET ORAL at 02:15

## 2018-11-21 RX ADMIN — Medication 19980 MILLI-UNITS/HR: at 01:01

## 2018-11-21 RX ADMIN — Medication 125 ML/HR: at 01:01

## 2018-11-21 NOTE — PROGRESS NOTES
~~ 1530 ASSUME CARE OF PT RESTING IN BED, SR UP X2, FOB & FRIEND IN, FRIEND SNUGGLING BABY. ASSESSMENT AS CHARTED. PT DENIES INCREASED BLEEDING, DIFF VOIDING, NAUSEA OR ITCHING. PT HAS CRAMPING- REVIEWED TIMES THAT MEDS ARE AVAILABLE, WHITE BOARD UPDATED. REVIEWED HEATING PAD AFTER HOME-  WATER MUG REFILLED & JUICE SERVED. ANOTHER FRIEND ARRIVES. 
 
~~1630 PT VISITING W/ VISITORS. NO NEEDS EXPRESSED 
 
~~1728 PT REQ TYLENOL-- GIVEN. PT RATES PAIN 5/10 AFTER REVIEW OF PAIN SCALE-  PT ATTEMPTING TO BREAST FEED- REVIEWED FEED NEED V/S SUCK NEED-  REVIEWED IMPORTANCE OF DEEP LATCH TO AVOID SORE NIPPLES. VISITORS OUT. 
 
~~1800 VISITORS W/ YOUNG CHILDREN ARRIVE 
 
~~ 1815 PT DENIES ANY RELIEF FR TYLENOL & SINCE MOTRIN IS NOW TIME AVAILABLE PT HAS ASKED FOR IT- GIVEN.

## 2018-11-21 NOTE — PROGRESS NOTES
Visited with mother and acquired permission to announce baby's name. Bhavani 3 Board Certified [employer]  
[phone]

## 2018-11-21 NOTE — PROGRESS NOTES
Labor progress note Pt feeling more pain with contractions and pressure despite epidural.  Overall she is more comfortable though. Vitals: 
Visit Vitals /68 Pulse (!) 105 Temp 97.8 °F (36.6 °C) Resp 18 SpO2 100% Breastfeeding? No  
 
 
Temp (24hrs), Av.1 °F (36.7 °C), Min:97.8 °F (36.6 °C), Max:98.8 °F (37.1 °C) Labs: WBC Date/Time Value Ref Range Status 2018 01:15 PM 10.8 4.6 - 13.2 K/uL Final  
2018 11:39 AM 8.0 3.4 - 10.8 x10E3/uL Final  
 
HGB Date/Time Value Ref Range Status 2018 01:15 PM 9.8 (L) 12.0 - 16.0 g/dL Final  
2018 11:39 AM 10.9 (L) 11.1 - 15.9 g/dL Final  
 
PLATELET Date/Time Value Ref Range Status 2018 01:15  135 - 420 K/uL Final  
 
 
 
 
Physical Exam: 
Cervical Exam:  7/100/+1 per  Close, CNM Membranes:  SROM Uterine Activity: q2-4min Fetal Heart Rate: 130s, cat I Pit @ 4 Assessment:  
27yo  at 39.6wks in labor Plan:  
Reassuring fetal status - Augmenting with Pitocin - Pt made aware of epidural button, discussed use - Discussed frequent position changes - Anticipate  Nithya Judd MD

## 2018-11-21 NOTE — PROGRESS NOTES
Verbal shift change report given to C. Mabelene Homans RN (oncoming nurse) by Claudia Lozoya RN (offgoing nurse). Report included the following information SBAR, Kardex, Procedure Summary, Intake/Output, MAR and Recent Results.

## 2018-11-21 NOTE — ROUTINE PROCESS
Bedside and Verbal shift change report given to Monica Pineda RN (oncoming nurse) by Sofia Harris RN (offgoing nurse). Report included the following information SBAR, Procedure Summary, Intake/Output, MAR and Recent Results.

## 2018-11-21 NOTE — ROUTINE PROCESS
Bedside and Verbal shift change report given to Radha Guevara (oncoming nurse) by [facility] RN (offgoing nurse). Report included the following information SBAR, Procedure Summary, Intake/Output, MAR and Recent Results.

## 2018-11-21 NOTE — PROGRESS NOTES
Labor Progress Note Teddy Cruz is breathing through her contractions. Asked if she uses her PCA button she notes that she forgets to use it. Family is clustered at her bedside and very supportive. Jammie cries that she is scared and they tend her wisely. She is tender on exam.  Encourage her to sit up a bit and prepare for the next phase. Discuss pushing efforts. Encourage to begin with grunting and move to full pushing. Has moved to pushing now. Pelvic exam:     
 Cervical Exam 
Dilation (cm): 10 Eff: 100 % Station: +1 Position: Anterior Cervical Consistency: Soft Vaginal exam done by? : cash hoover Baby Position: Vertex Membrane Status: SROM 
FHTs Reactive from 150. Deep variables appear as pushing initiated. UCs q 2-3 with pitocin at 5 mu Visit Vitals /67 Pulse (!) 105 Temp 98.6 °F (37 °C) Resp 18 SpO2 100% Breastfeeding? No  
 
 
Temp (24hrs), Av.3 °F (36.8 °C), Min:97.8 °F (36.6 °C), Max:98.9 °F (37.2 °C) Recent Labs  
  18 
1315 WBC 10.8 HGB 9.8* Assessment: Second stage. Category 2 tracing. Plan:Reduce pitocin from 5 mu to 2 mu to increase contraction interval. Continue pushing. Will await response and notify Dr Santosh Boateng as indicated. Pascual Sandoval CNM 
2018 11:57 PM

## 2018-11-21 NOTE — PROGRESS NOTES
0: This RN to room, pt turned from L lateral to R lateral, peanut ball placed between legs to encourage descent. : SVE by Paresh Johnson CNM. Pt /+1. Pt reporting increased pressure, encouraged use of bolus button for epidural. 
 
: Pt turned from R lateral to L lateral, reports continued pressure even with epidural bolus button, but pressure is intermittent with contractions and tolerable per pt. Educated pt to call out when pressure is constant. Pt verbalizes understanding. : Pt called out stating she had vomited, but she feels better. Asked if this is normal. Reassured that vomiting is normal during labor, pt comforted by this. : Pt turned from L lateral to R lateral, chux pad changed. Pt still reports relief from pressure between contractions, and can rest in between. 5: Pt turned back to L lateral due to recurrent late decelerations. Recovery noted after turn. Chux pad changed, pt still feeling relief between contractions, though less than previously. Still does not feel urge to push. 2340: SVE by Paresh Johnson CNM. Pt complete/+1.  
 
2350: Pt begins pushing with ctx at this time. 0041  of live female infant with spontaneous cry. Placed on maternal abdomen, suctioned with bulb syringe, stimualted with warm blankets. cord clamped immediately. Pitocin bolus started at this time. 0049 spotnaneous delivery of intact placenta at this time. 0051: 800 mcg cytotec given rectally at this time. 0059: PP fundal check performed at this time, epidural pump off. 
 
0300: Pt assisted up to bathroom to attempt to void. Voided large amount light yellow urine. Pericare performed, ice pack, pad, and dermoplast applied, assisted back to bed. Chux pad and linens changed. Gown changed. Pt ambulated without difficulty. 0335: Pt transported via wheelchair to [place] [id number] at this time with s/o and belongings at side, infant pushed in bassinet.  Oriented to room and care setting, assisted to bed. Pt in NAD. 26: SBAR to Jazmine Horner rn at this time. Care relinquished.

## 2018-11-21 NOTE — ANESTHESIA POSTPROCEDURE EVALUATION
* No procedures listed *. Anesthesia Post Evaluation Multimodal analgesia: multimodal analgesia not used between 6 hours prior to anesthesia start to PACU discharge Patient location during evaluation: bedside Patient participation: complete - patient participated Level of consciousness: awake and alert Pain management: satisfactory to patient Airway patency: patent Anesthetic complications: no 
Cardiovascular status: acceptable Respiratory status: acceptable Hydration status: acceptable Post anesthesia nausea and vomiting:  none Visit Vitals /67 (BP 1 Location: Right arm, BP Patient Position: At rest) Pulse 95 Temp 36.8 °C (98.2 °F) Resp 18 SpO2 100% Breastfeeding? Unknown

## 2018-11-21 NOTE — ROUTINE PROCESS
TRANSFER - IN REPORT: 
 
Verbal report received from IRA Hodge RN(name) on [facility]  being received from L/D (unit) for routine progression of care Report consisted of patients Situation, Background, Assessment and  
Recommendations(SBAR). Information from the following report(s) SBAR, Procedure Summary, Intake/Output, MAR and Recent Results was reviewed with the receiving nurse. Opportunity for questions and clarification was provided. Assessment completed upon patients arrival to unit and care assumed.

## 2018-11-21 NOTE — LACTATION NOTE
Mom states baby is nursing well, has nursed 4 times. Discussed nursing pattern/expectations, latch, colostrum, cluster feeding, milk coming in, hindmilk, wet/dirty diapers. Offered help with feedings. Info sheet, daily log and resource list given. Encouraged to call with questions.

## 2018-11-21 NOTE — PROGRESS NOTES
Labor Progress Note Winnie Zamudio asks re exam.  She reports pressure in her vagina and is breathing through the contractions. Oriented to the PCA button for her to use as needed. Dr Carlos Moreau at the bedside with me. Pelvic exam:     
 Cervical Exam 
Dilation (cm): 7 Eff: 100 % Station: +1 Anterior lip noted Cervical Consistency: Soft Vaginal exam done by? : cash hoover Baby Position: Vertex Membrane Status: SROM 
FHTs 135 with moderate variability and occasional variable UCs q 3 with pitocin at 4 mu Visit Vitals /68 Pulse (!) 105 Temp 97.8 °F (36.6 °C) Resp 18 SpO2 100% Breastfeeding? No  
 
 
Temp (24hrs), Av.1 °F (36.7 °C), Min:97.8 °F (36.6 °C), Max:98.8 °F (37.1 °C) Recent Labs  
  18 
1315 WBC 10.8 HGB 9.8* Assessment: Good descent. Maternal and fetal wellbeing. Plan: Continue augmentation. Anticipate . Katie Callaway CNM 
2018 
7:43 PM

## 2018-11-22 VITALS
SYSTOLIC BLOOD PRESSURE: 108 MMHG | OXYGEN SATURATION: 99 % | TEMPERATURE: 97.9 F | DIASTOLIC BLOOD PRESSURE: 71 MMHG | HEART RATE: 92 BPM | RESPIRATION RATE: 16 BRPM

## 2018-11-22 LAB
ABO + RH BLD: NORMAL
ABO + RH BLDCO: NORMAL
BLD PROD TYP BPU: NORMAL
BPU ID: NORMAL
CALLED TO:,BCALL1: NORMAL
FETAL SCREEN,FMHS: NORMAL
HCT VFR BLD AUTO: 26.2 % (ref 35–45)
HGB BLD-MCNC: 8.4 G/DL (ref 12–16)
STATUS OF UNIT,%ST: NORMAL
UNIT DIVISION, %UDIV: 0

## 2018-11-22 PROCEDURE — 85018 HEMOGLOBIN: CPT

## 2018-11-22 PROCEDURE — 36415 COLL VENOUS BLD VENIPUNCTURE: CPT

## 2018-11-22 PROCEDURE — 85014 HEMATOCRIT: CPT

## 2018-11-22 PROCEDURE — 74011250637 HC RX REV CODE- 250/637: Performed by: ADVANCED PRACTICE MIDWIFE

## 2018-11-22 RX ORDER — DOCUSATE SODIUM 100 MG/1
100 CAPSULE, LIQUID FILLED ORAL DAILY
Qty: 60 CAP | Refills: 0 | Status: SHIPPED | OUTPATIENT
Start: 2018-11-22

## 2018-11-22 RX ORDER — IRON POLYSACCHARIDE COMPLEX 150 MG
150 CAPSULE ORAL DAILY
Qty: 90 CAP | Refills: 1 | Status: SHIPPED | OUTPATIENT
Start: 2018-11-22

## 2018-11-22 RX ORDER — ACETAMINOPHEN 325 MG/1
650 TABLET ORAL
Qty: 60 TAB | Refills: 1 | Status: SHIPPED | OUTPATIENT
Start: 2018-11-22

## 2018-11-22 RX ORDER — IBUPROFEN 800 MG/1
800 TABLET ORAL
Qty: 60 TAB | Refills: 1 | Status: SHIPPED | OUTPATIENT
Start: 2018-11-22

## 2018-11-22 RX ADMIN — IBUPROFEN 800 MG: 400 TABLET ORAL at 10:48

## 2018-11-22 RX ADMIN — IBUPROFEN 800 MG: 400 TABLET ORAL at 02:15

## 2018-11-22 NOTE — ROUTINE PROCESS
Bedside and Verbal shift change report given to LUCY Reece (oncoming nurse) by Doc MOORE (offgoing nurse). Report included the following information SBAR, Kardex, OR Summary, Procedure Summary, Intake/Output, MAR, Recent Results and Med Rec Status. Assumed care of pt. Introduced myself and updated whiteboard, explained nursing plan of care for my shift. Pt alert and oriented; assessment and vitals completed, WNL. Pt denies headache, visual disturbances and epigastric pain. Pt verbalized agreement to plan. Questions answered. [address] Pt discharge teaching reinforced, pt has no questions at this time. Incision clean, dry, intact. Vitals signs stable. ID bands removed and shredded at pt request. Pt discharged home with belongings via wheelchair to car.

## 2018-11-22 NOTE — PROGRESS NOTES
1910 - Bedside and Verbal shift change report given to TERESA Velez (oncoming nurse) by Shashi Green RN (offgoing nurse). Report included the following information SBAR, Kardex, Intake/Output, MAR and Recent Results. 1920 - Introductions. Discussed plan for shift, hourly rounding, pain reporting/management, normal vs abnormal findings, tests/procedures ordered, when to call nurse and pt safety. Questions answered. Assessment completed. VSS. Pt encouraged to empty bladder more frequently to decrease cramping. ID band matched to infant and infant taken to nursery. Aayz Quesada returned from nursery. Questions answered. 2030 - Pt does not want the flu vaccine. Education given and questions answered, but pt declines. Rhogam eval already drawn early this AM and Rhogam given to pt this AM.  
 
11/22/18 
 
0215 - Reassessment. VSS. Medicated for pain. Infant taken to nursery for testing.  
 
0340 - Infant returned to room. 0700 - Bedside and Verbal shift change report given to Yolis Montelongo RN (oncoming nurse) by Miguel A Herrera RN (offgoing nurse). Report included the following information SBAR, Kardex, Intake/Output, MAR and Recent Results.

## 2018-11-22 NOTE — DISCHARGE SUMMARY
Postpartum Day 2 Progress and Discharge Note    Jammie is doing well post-partum without significant complaint. Pain well controlled with current medication. Voiding without difficulty, normal lochia, passing flatus. Breastfeeding well. Baby stable. Vitals:    Patient Vitals for the past 8 hrs:   BP Temp Pulse Resp SpO2   18 0215 118/67 97.8 °F (36.6 °C) 94 17 99 %     Temp (24hrs), Av.2 °F (36.8 °C), Min:97.8 °F (36.6 °C), Max:98.7 °F (37.1 °C)      Vital signs stable, afebrile. Exam:  Patient without distress. Breasts intact and non tender. Abdomen soft, fundus firm at level of umbilicus, non tender. Perineum with normal lochia noted. Lower extremities are negative for swelling or tenderness. Lab/Data Review:  CBC:   Lab Results   Component Value Date/Time    HGB 8.4 (L) 2018 05:18 AM    HCT 26.2 (L) 2018 05:18 AM     \  Assessment and Plan: Patient appears to be having uncomplicated post-partum course and condition is stable for discharge home. Continue routine perineal care and maternal education. Plan discharge for today with follow up in our office in 6 weeks.     Kalia Vivas CNM  2018

## 2018-11-22 NOTE — DISCHARGE INSTRUCTIONS
